# Patient Record
(demographics unavailable — no encounter records)

---

## 2024-12-17 NOTE — HISTORY OF PRESENT ILLNESS
[FreeTextEntry1] : 31-year-old male with complaints of lower abdominal pain and prolapsing bleeding hemorrhoids symptoms have been present for many years but worsening

## 2024-12-17 NOTE — PHYSICAL EXAM
[Abdomen Masses] : No abdominal masses [Tender] : nontender [Normal rectal exam] : exam was normal [Tender, Swollen] : tender, swollen [Normal] : was normal [None] : there was no rectal mass  [JVD] : no jugular venous distention  [Normal Breath Sounds] : Normal breath sounds [Normal Heart Sounds] : normal heart sounds [Normal Rate and Rhythm] : normal rate and rhythm [No Rash or Lesion] : No rash or lesion [Alert] : alert [Oriented to Person] : oriented to person [Oriented to Place] : oriented to place [Oriented to Time] : oriented to time [Calm] : calm [de-identified] : Large grade 3 hemorrhoids [de-identified] : Looks well in no distress, of stated age. [de-identified] : Pupils equal reactive to light normocephalic atraumatic.

## 2024-12-17 NOTE — REVIEW OF SYSTEMS
[As Noted in HPI] : as noted in HPI [Abdominal Pain] : abdominal pain [Negative] : Heme/Lymph [FreeTextEntry7] : Bleeding hemorrhoids

## 2024-12-17 NOTE — ASSESSMENT
[FreeTextEntry1] : 31-year-old male with lower abdominal pain and bleeding hemorrhoids.  Recommend CT scan abdomen pelvis p.o. and IV contrast rule out diverticulitis blood work including CBC BMP liver function test amylase lipase.  Recommend rubber band ligation procedure for bleeding hemorrhoids recommend high fiber diet, Metamucil daily, sitz baths, stool softeners, pain medications p.r.n.

## 2025-01-14 NOTE — PHYSICAL EXAM
[Abdomen Masses] : No abdominal masses [Tender] : nontender [Normal rectal exam] : exam was normal [Tender, Swollen] : tender, swollen [Normal] : was normal [None] : there was no rectal mass  [JVD] : no jugular venous distention  [Normal Breath Sounds] : Normal breath sounds [Normal Heart Sounds] : normal heart sounds [Normal Rate and Rhythm] : normal rate and rhythm [No Rash or Lesion] : No rash or lesion [Alert] : alert [Oriented to Person] : oriented to person [Oriented to Place] : oriented to place [Oriented to Time] : oriented to time [Calm] : calm [de-identified] : Large grade 3 hemorrhoids [de-identified] : Looks well in no distress, of stated age. [de-identified] : Pupils equal reactive to light normocephalic atraumatic.

## 2025-01-14 NOTE — ASSESSMENT
[FreeTextEntry1] : 31-year-old male with lower abdominal pain and bleeding hemorrhoids.  Recent CAT scan and blood work were normal no evidence of diverticulitis.  Recommend rubber band ligation procedure for hemorrhoids after informed consent was obtained anoscopy was performed with a lighted anoscope which demonstrated multiple large grade 3 internal hemorrhoids. Rubber band ligation procedure was done to 2 internal hemorrhoids above the dentate line without any complications. Patient all procedure well. Patient was given post rubber band ligation instructions.  Recommend high fiber diet, Metamucil daily, sitz baths, stool softeners, pain medications p.r.n.

## 2025-01-14 NOTE — HISTORY OF PRESENT ILLNESS
[FreeTextEntry1] : 31-year-old male with complaints of lower abdominal pain and prolapsing bleeding hemorrhoids symptoms have been present for many years but worsening.  Underwent recent CAT scan was normal and blood work was normal.